# Patient Record
Sex: FEMALE | Race: WHITE | NOT HISPANIC OR LATINO | Employment: FULL TIME | URBAN - METROPOLITAN AREA
[De-identification: names, ages, dates, MRNs, and addresses within clinical notes are randomized per-mention and may not be internally consistent; named-entity substitution may affect disease eponyms.]

---

## 2023-12-21 ENCOUNTER — TELEPHONE (OUTPATIENT)
Dept: GASTROENTEROLOGY | Facility: CLINIC | Age: 58
End: 2023-12-21
Payer: COMMERCIAL

## 2023-12-21 NOTE — TELEPHONE ENCOUNTER
----- Message from Mary Acosta sent at 12/21/2023  3:17 PM CST -----  Contact: Patient  Patient is requesting to see Dr. Cameron. I could not find a appointment for January/February. Please call patient at [phone]

## 2023-12-22 ENCOUNTER — TELEPHONE (OUTPATIENT)
Dept: UROLOGY | Facility: CLINIC | Age: 58
End: 2023-12-22
Payer: COMMERCIAL

## 2023-12-22 NOTE — TELEPHONE ENCOUNTER
Returned request for call back. Patient states that she will call us after the first of the year to set up an appointment with Dr. Damon because she is going out of town.                     ----- Message from Hoda Martinez sent at 12/22/2023  2:13 PM CST -----  Contact: Patient  Patient called to consult with nurse or staff regarding a missed call. She states the call is regarding an appointment with Dr. Damon. She states she is about to leave for out out town and will contact the clinic some time next week to schedule. Thanks/MR

## 2024-01-03 ENCOUNTER — TELEPHONE (OUTPATIENT)
Dept: GASTROENTEROLOGY | Facility: CLINIC | Age: 59
End: 2024-01-03
Payer: COMMERCIAL

## 2024-01-03 NOTE — TELEPHONE ENCOUNTER
Pt calling wanting to establish care. I informed pt that she will need to get her PCP to send a referral with her medical records to our office for [facility] to review. Pt verbalized understanding.

## 2024-01-03 NOTE — TELEPHONE ENCOUNTER
----- Message from Blanca Tam sent at 1/3/2024  3:30 PM CST -----  Contact: Patient  Type:  Patient Requesting Call Back    Who Called:Emy Verdugo   Would the patient rather a call back or a response via [facility]? Call back  Best Call Back Number:[phone]   Additional Information: Patient is calling to schedule an appointment with . Patient entail she has suspicions of rectum prolapsing. Patient ask to get a call back at the earliest convenience.

## 2024-01-04 DIAGNOSIS — R39.89 BLADDER PAIN: Primary | ICD-10-CM

## 2025-01-23 ENCOUNTER — OFFICE VISIT (OUTPATIENT)
Dept: OBSTETRICS AND GYNECOLOGY | Facility: CLINIC | Age: 60
End: 2025-01-23
Payer: COMMERCIAL

## 2025-01-23 VITALS — WEIGHT: 183.19 LBS | HEART RATE: 91 BPM | DIASTOLIC BLOOD PRESSURE: 83 MMHG | SYSTOLIC BLOOD PRESSURE: 127 MMHG

## 2025-01-23 DIAGNOSIS — R30.0 DYSURIA: ICD-10-CM

## 2025-01-23 DIAGNOSIS — N73.9 PELVIC ABSCESS IN FEMALE: Primary | ICD-10-CM

## 2025-01-23 PROCEDURE — 3079F DIAST BP 80-89 MM HG: CPT | Mod: CPTII,,, | Performed by: OBSTETRICS & GYNECOLOGY

## 2025-01-23 PROCEDURE — 1160F RVW MEDS BY RX/DR IN RCRD: CPT | Mod: CPTII,,, | Performed by: OBSTETRICS & GYNECOLOGY

## 2025-01-23 PROCEDURE — 3074F SYST BP LT 130 MM HG: CPT | Mod: CPTII,,, | Performed by: OBSTETRICS & GYNECOLOGY

## 2025-01-23 PROCEDURE — 1159F MED LIST DOCD IN RCRD: CPT | Mod: CPTII,,, | Performed by: OBSTETRICS & GYNECOLOGY

## 2025-01-23 PROCEDURE — 99203 OFFICE O/P NEW LOW 30 MIN: CPT | Mod: S$PBB,,, | Performed by: OBSTETRICS & GYNECOLOGY

## 2025-01-23 RX ORDER — METOPROLOL SUCCINATE 25 MG/1
TABLET, EXTENDED RELEASE ORAL
COMMUNITY

## 2025-01-23 RX ORDER — PROMETHAZINE HYDROCHLORIDE 25 MG/1
TABLET ORAL
COMMUNITY
Start: 2024-12-13

## 2025-01-23 NOTE — PROGRESS NOTES
S a 59-year-old female who has had a previous JAIC BSO has been suffering with a pelvic abscess she has had 2 looks like Penrose drains put in for over a month her previous ultrasound CT scan showed a 8 cm collection of fluid were trying to trace down the new when she said her GI doctor told her that just showed inflammation apparently no accumulation fluid she is here today because she has some burning on urination she is on sulfur medication now for a UTI therefore will send in a culture on the urine and will go from there on pelvic examination I can feel what I think is the Penrose drain her some firm area in the rectal area little tenderness in suprapubic area having no masses but do not feel a mass or fluctuance 30 minutes was spent with the patient getting her history reviewing her CT scan getting her recent most recent CT scan in evaluating her urine

## 2025-08-07 ENCOUNTER — TELEPHONE (OUTPATIENT)
Dept: GASTROENTEROLOGY | Facility: CLINIC | Age: 60
End: 2025-08-07
Payer: COMMERCIAL

## 2025-08-07 NOTE — TELEPHONE ENCOUNTER
Copied from CRM #[id number]. Topic: General Inquiry - Patient Advice  >> Aug 7, 2025 11:24 AM Nelia wrote:  Type:  Needs Medical Advice    Who Called: Emy  Symptoms (please be specific):    How long has patient had these symptoms:    Pharmacy name and phone #:    Would the patient rather a call back or a response via [facility]? Call back  Best Call Back Number:  [phone]  Additional Information:  Patient called  because she thinks she has another abscess on her rectum. She had this a year ago.She is afraid this is happening again and not sure why. She is vomiting some also. She wants to do something before it gets too bad. Please call back [phone]. She wants to be seen as soon as possible.

## 2025-08-08 ENCOUNTER — OUTSIDE PLACE OF SERVICE (OUTPATIENT)
Dept: INTERVENTIONAL RADIOLOGY/VASCULAR | Facility: CLINIC | Age: 60
End: 2025-08-08
Payer: COMMERCIAL

## 2025-08-18 ENCOUNTER — TELEPHONE (OUTPATIENT)
Dept: GASTROENTEROLOGY | Facility: CLINIC | Age: 60
End: 2025-08-18
Payer: COMMERCIAL